# Patient Record
Sex: FEMALE | Race: BLACK OR AFRICAN AMERICAN | URBAN - METROPOLITAN AREA
[De-identification: names, ages, dates, MRNs, and addresses within clinical notes are randomized per-mention and may not be internally consistent; named-entity substitution may affect disease eponyms.]

---

## 2018-07-23 ENCOUNTER — CLINICAL SUPPORT (OUTPATIENT)
Dept: URGENT CARE | Facility: CLINIC | Age: 58
End: 2018-07-23
Payer: COMMERCIAL

## 2018-07-23 DIAGNOSIS — Z23 NEED FOR IMMUNIZATION WITH DIPHTHERIA, TETANUS, AND POLIOVIRUS VACCINE: Primary | ICD-10-CM

## 2018-07-23 PROCEDURE — 90471 IMMUNIZATION ADMIN: CPT | Mod: S$GLB,,, | Performed by: EMERGENCY MEDICINE

## 2018-07-23 PROCEDURE — 90715 TDAP VACCINE 7 YRS/> IM: CPT | Mod: S$GLB,,, | Performed by: EMERGENCY MEDICINE

## 2021-05-24 ENCOUNTER — OFFICE VISIT (OUTPATIENT)
Dept: URGENT CARE | Facility: CLINIC | Age: 61
End: 2021-05-24
Payer: COMMERCIAL

## 2021-05-24 VITALS
RESPIRATION RATE: 18 BRPM | BODY MASS INDEX: 17.77 KG/M2 | HEART RATE: 81 BPM | DIASTOLIC BLOOD PRESSURE: 73 MMHG | HEIGHT: 69 IN | TEMPERATURE: 99 F | SYSTOLIC BLOOD PRESSURE: 127 MMHG | WEIGHT: 120 LBS | OXYGEN SATURATION: 98 %

## 2021-05-24 DIAGNOSIS — K52.9 GASTROENTERITIS: Primary | ICD-10-CM

## 2021-05-24 LAB
CTP QC/QA: YES
SARS-COV-2 RDRP RESP QL NAA+PROBE: NEGATIVE

## 2021-05-24 PROCEDURE — U0002: ICD-10-PCS | Mod: QW,S$GLB,, | Performed by: FAMILY MEDICINE

## 2021-05-24 PROCEDURE — 3008F PR BODY MASS INDEX (BMI) DOCUMENTED: ICD-10-PCS | Mod: CPTII,S$GLB,, | Performed by: FAMILY MEDICINE

## 2021-05-24 PROCEDURE — 99203 OFFICE O/P NEW LOW 30 MIN: CPT | Mod: S$GLB,,, | Performed by: FAMILY MEDICINE

## 2021-05-24 PROCEDURE — U0002 COVID-19 LAB TEST NON-CDC: HCPCS | Mod: QW,S$GLB,, | Performed by: FAMILY MEDICINE

## 2021-05-24 PROCEDURE — 99203 PR OFFICE/OUTPT VISIT, NEW, LEVL III, 30-44 MIN: ICD-10-PCS | Mod: S$GLB,,, | Performed by: FAMILY MEDICINE

## 2021-05-24 PROCEDURE — 3008F BODY MASS INDEX DOCD: CPT | Mod: CPTII,S$GLB,, | Performed by: FAMILY MEDICINE

## 2021-05-24 RX ORDER — LOPERAMIDE HYDROCHLORIDE 2 MG/1
2 CAPSULE ORAL 4 TIMES DAILY PRN
COMMUNITY

## 2021-05-24 RX ORDER — ICOSAPENT ETHYL 500 MG/1
CAPSULE ORAL
COMMUNITY

## 2021-05-24 RX ORDER — FENOFIBRATE 145 MG/1
TABLET, FILM COATED ORAL
COMMUNITY

## 2021-05-24 RX ORDER — ERGOCALCIFEROL 1.25 MG/1
CAPSULE ORAL
COMMUNITY

## 2021-05-24 RX ORDER — DICYCLOMINE HYDROCHLORIDE 20 MG/1
20 TABLET ORAL EVERY 6 HOURS
Qty: 30 TABLET | Refills: 0 | Status: SHIPPED | OUTPATIENT
Start: 2021-05-24

## 2021-05-24 RX ORDER — VITAMIN B COMPLEX
1 CAPSULE ORAL
COMMUNITY

## 2021-05-24 RX ORDER — ASPIRIN 325 MG
325 TABLET ORAL
COMMUNITY

## 2023-12-19 ENCOUNTER — OFFICE VISIT (OUTPATIENT)
Dept: URGENT CARE | Facility: CLINIC | Age: 63
End: 2023-12-19
Payer: COMMERCIAL

## 2023-12-19 VITALS
DIASTOLIC BLOOD PRESSURE: 72 MMHG | SYSTOLIC BLOOD PRESSURE: 125 MMHG | WEIGHT: 120 LBS | OXYGEN SATURATION: 100 % | RESPIRATION RATE: 16 BRPM | HEART RATE: 72 BPM | BODY MASS INDEX: 17.77 KG/M2 | TEMPERATURE: 98 F | HEIGHT: 69 IN

## 2023-12-19 DIAGNOSIS — R11.0 NAUSEA: ICD-10-CM

## 2023-12-19 DIAGNOSIS — R19.7 DIARRHEA, UNSPECIFIED TYPE: Primary | ICD-10-CM

## 2023-12-19 PROCEDURE — 99213 OFFICE O/P EST LOW 20 MIN: CPT | Mod: S$GLB,,, | Performed by: NURSE PRACTITIONER

## 2023-12-19 PROCEDURE — 99213 PR OFFICE/OUTPT VISIT, EST, LEVL III, 20-29 MIN: ICD-10-PCS | Mod: S$GLB,,, | Performed by: NURSE PRACTITIONER

## 2023-12-19 RX ORDER — ONDANSETRON 4 MG/1
4 TABLET, ORALLY DISINTEGRATING ORAL EVERY 6 HOURS PRN
Qty: 20 TABLET | Refills: 1 | Status: SHIPPED | OUTPATIENT
Start: 2023-12-19 | End: 2024-12-18

## 2023-12-19 NOTE — PROGRESS NOTES
"Subjective:      Patient ID: Agatha Ko is a 63 y.o. female.    Vitals:  height is 5' 9" (1.753 m) and weight is 54.4 kg (120 lb). Her temperature is 97.7 °F (36.5 °C). Her blood pressure is 125/72 and her pulse is 72. Her respiration is 16 and oxygen saturation is 100%.     Chief Complaint: Diarrhea    Diarrhea   This is a new problem. The current episode started in the past 7 days (Saturday). The problem occurs 2 to 4 times per day. The problem has been waxing and waning. The stool consistency is described as Mucous (cloudy). The patient states that diarrhea awakens her from sleep. Associated symptoms include abdominal pain, bloating and chills. Pertinent negatives include no coughing, fever, headaches, sweats, URI or vomiting. Nothing aggravates the symptoms. Treatments tried: pepto. The treatment provided mild relief.       Constitution: Positive for chills. Negative for fever.   Respiratory:  Negative for cough.    Gastrointestinal:  Positive for abdominal pain and diarrhea. Negative for vomiting.   Neurological:  Negative for headaches.    Nausea is intermittent   Objective:     Physical Exam   Constitutional: She is oriented to person, place, and time.  Non-toxic appearance. She does not appear ill. No distress.   HENT:   Head: Normocephalic and atraumatic.   Ears:   Right Ear: Tympanic membrane normal.   Left Ear: Tympanic membrane normal.   Nose: No congestion.   Mouth/Throat: Mucous membranes are moist. Oropharynx is clear.   Eyes: Conjunctivae are normal. Pupils are equal, round, and reactive to light. Extraocular movement intact   Cardiovascular: Normal rate, regular rhythm, normal heart sounds and normal pulses.   Pulmonary/Chest: Effort normal and breath sounds normal. No respiratory distress. She has no wheezes.   Abdominal: Normal appearance. Bowel sounds are increased.      Comments: Pt states she is having to wear a depends diaper due to the number of stools    Musculoskeletal:      Right " lower leg: No edema.      Left lower leg: No edema.   Neurological: no focal deficit. She is alert and oriented to person, place, and time.   Skin: Skin is not diaphoretic.   Psychiatric: Her behavior is normal. Mood normal.   Nursing note and vitals reviewed.  VSS no need for IV fluids at this time.  Pt is drinking well (Powerade and Pedilyte)  Assessment:     1. Diarrhea, unspecified type    2. Nausea  - ondansetron (ZOFRAN-ODT) 4 MG TbDL; Take 1 tablet (4 mg total) by mouth every 6 (six) hours as needed (for nausea and vomiting).  Dispense: 20 tablet; Refill: 1          Plan:     Patient Instructions   Diarrhea, Adult ED   General Information   You came to the Emergency Department (ED) for diarrhea. Most doctors say you have diarrhea if you have 3 or more runny or watery stools or bowel movements in a day. The doctors feel that the risk of a serious cause for your diarrhea is low.  Diarrhea that starts all of a sudden is most often caused by a virus or bacteria. This will likely get better on its own after a few days. Other things can cause you to have loose stools like:  Side effects from the medicines you take.  Problems digesting your food.  Problems with your digestive system.  You may be waiting on some test results. The staff will contact you if there are concerning results.  What care is needed at home?   Call your regular doctor to let them know you were in the ED. Make a follow-up appointment if you were told to.  Drink small amounts of fluid every 15 to 30 minutes. Good fluids to drink are water, broth, and oral electrolyte solutions. Sugar-free or very low sugar sports drinks are also OK.  Try to eat a small amount of food. Good foods to eat are potatoes, noodles, rice, oatmeal, crackers, soup, soft vegetables, and bananas. Avoid fatty foods and dairy products.  Wash your hands often. This will help keep others healthy. It is easy to spread diarrhea from one person to the next.  Stay home from school  or work until you have stopped having diarrhea. Do not cook food for others while you have diarrhea.  If you were given any medicines, make sure to take them as you were told.  When do I need to get emergency help?   Return to the ED if:   You have signs of severe fluid loss, such as:  No urine for more than 8 hours.  Feel very light-headed or like you are going to pass out.  Feel weak like you are going to fall.  Your stools have a large amount (more than 1 teaspoon or 5 mL) of blood in them.  You have very bad belly pain.  When do I need to call the doctor?   You have more than 6 runny, watery stools in 24 hours.  You have a fever of 101.3°F (38.5°C) or higher or chills that do not go away after a day.  You have very bad belly pain.  Your stools have a small amount (less than 1 teaspoon or 5 mL) of blood in them.  You develop early signs of fluid loss, such as:  Your urine is very dark colored.  Your mouth is dry.  You have muscle cramps.  You have a lack of energy.  You feel light-headed when you get up.  You have new or worsening symptoms.  Last Reviewed Date   2021-05-21  Consumer Information Use and Disclaimer   This information is not specific medical advice and does not replace information you receive from your health care provider. This is only a brief summary of general information. It does NOT include all information about conditions, illnesses, injuries, tests, procedures, treatments, therapies, discharge instructions or life-style choices that may apply to you. You must talk with your health care provider for complete information about your health and treatment options. This information should not be used to decide whether or not to accept your health care providers advice, instructions or recommendations. Only your health care provider has the knowledge and training to provide advice that is right for you.  Copyright   Copyright © 2021 [employer] and its affiliates and/or licensors. All rights  reserved.

## 2023-12-19 NOTE — PATIENT INSTRUCTIONS
Diarrhea, Adult ED   General Information   You came to the Emergency Department (ED) for diarrhea. Most doctors say you have diarrhea if you have 3 or more runny or watery stools or bowel movements in a day. The doctors feel that the risk of a serious cause for your diarrhea is low.  Diarrhea that starts all of a sudden is most often caused by a virus or bacteria. This will likely get better on its own after a few days. Other things can cause you to have loose stools like:  Side effects from the medicines you take.  Problems digesting your food.  Problems with your digestive system.  You may be waiting on some test results. The staff will contact you if there are concerning results.  What care is needed at home?   Call your regular doctor to let them know you were in the ED. Make a follow-up appointment if you were told to.  Drink small amounts of fluid every 15 to 30 minutes. Good fluids to drink are water, broth, and oral electrolyte solutions. Sugar-free or very low sugar sports drinks are also OK.  Try to eat a small amount of food. Good foods to eat are potatoes, noodles, rice, oatmeal, crackers, soup, soft vegetables, and bananas. Avoid fatty foods and dairy products.  Wash your hands often. This will help keep others healthy. It is easy to spread diarrhea from one person to the next.  Stay home from school or work until you have stopped having diarrhea. Do not cook food for others while you have diarrhea.  If you were given any medicines, make sure to take them as you were told.  When do I need to get emergency help?   Return to the ED if:   You have signs of severe fluid loss, such as:  No urine for more than 8 hours.  Feel very light-headed or like you are going to pass out.  Feel weak like you are going to fall.  Your stools have a large amount (more than 1 teaspoon or 5 mL) of blood in them.  You have very bad belly pain.  When do I need to call the doctor?   You have more than 6 runny, watery stools in  24 hours.  You have a fever of 101.3°F (38.5°C) or higher or chills that do not go away after a day.  You have very bad belly pain.  Your stools have a small amount (less than 1 teaspoon or 5 mL) of blood in them.  You develop early signs of fluid loss, such as:  Your urine is very dark colored.  Your mouth is dry.  You have muscle cramps.  You have a lack of energy.  You feel light-headed when you get up.  You have new or worsening symptoms.  Last Reviewed Date   2021-05-21  Consumer Information Use and Disclaimer   This information is not specific medical advice and does not replace information you receive from your health care provider. This is only a brief summary of general information. It does NOT include all information about conditions, illnesses, injuries, tests, procedures, treatments, therapies, discharge instructions or life-style choices that may apply to you. You must talk with your health care provider for complete information about your health and treatment options. This information should not be used to decide whether or not to accept your health care providers advice, instructions or recommendations. Only your health care provider has the knowledge and training to provide advice that is right for you.  Copyright   Copyright © 2021 [employer] and its affiliates and/or licensors. All rights reserved.

## 2024-03-04 ENCOUNTER — OFFICE VISIT (OUTPATIENT)
Dept: URGENT CARE | Facility: CLINIC | Age: 64
End: 2024-03-04
Payer: COMMERCIAL

## 2024-03-04 VITALS
RESPIRATION RATE: 16 BRPM | OXYGEN SATURATION: 99 % | WEIGHT: 122.69 LBS | BODY MASS INDEX: 18.17 KG/M2 | DIASTOLIC BLOOD PRESSURE: 67 MMHG | TEMPERATURE: 98 F | SYSTOLIC BLOOD PRESSURE: 119 MMHG | HEART RATE: 73 BPM | HEIGHT: 69 IN

## 2024-03-04 DIAGNOSIS — R19.7 DIARRHEA, UNSPECIFIED TYPE: ICD-10-CM

## 2024-03-04 DIAGNOSIS — R10.84 GENERALIZED ABDOMINAL PAIN: Primary | ICD-10-CM

## 2024-03-04 LAB
BILIRUB UR QL STRIP: POSITIVE
GLUCOSE UR QL STRIP: NEGATIVE
KETONES UR QL STRIP: NEGATIVE
LEUKOCYTE ESTERASE UR QL STRIP: NEGATIVE
PH, POC UA: 5.5
POC BLOOD, URINE: POSITIVE
POC NITRATES, URINE: NEGATIVE
PROT UR QL STRIP: NEGATIVE
SP GR UR STRIP: 1.02 (ref 1–1.03)
UROBILINOGEN UR STRIP-ACNC: NORMAL (ref 0.1–1.1)

## 2024-03-04 PROCEDURE — 81003 URINALYSIS AUTO W/O SCOPE: CPT | Mod: QW,S$GLB,, | Performed by: NURSE PRACTITIONER

## 2024-03-04 PROCEDURE — 99214 OFFICE O/P EST MOD 30 MIN: CPT | Mod: S$GLB,,, | Performed by: NURSE PRACTITIONER

## 2024-03-04 RX ORDER — DICYCLOMINE HYDROCHLORIDE 20 MG/1
20 TABLET ORAL EVERY 6 HOURS PRN
Qty: 20 TABLET | Refills: 0 | Status: SHIPPED | OUTPATIENT
Start: 2024-03-04 | End: 2024-03-09

## 2024-03-04 RX ORDER — LIDOCAINE HYDROCHLORIDE 20 MG/ML
10 SOLUTION OROPHARYNGEAL
Status: COMPLETED | OUTPATIENT
Start: 2024-03-04 | End: 2024-03-04

## 2024-03-04 RX ORDER — ALUMINUM HYDROXIDE, MAGNESIUM HYDROXIDE, AND SIMETHICONE 1200; 120; 1200 MG/30ML; MG/30ML; MG/30ML
30 SUSPENSION ORAL
Status: COMPLETED | OUTPATIENT
Start: 2024-03-04 | End: 2024-03-04

## 2024-03-04 RX ORDER — DICYCLOMINE HYDROCHLORIDE 10 MG/5ML
20 SOLUTION ORAL
Status: COMPLETED | OUTPATIENT
Start: 2024-03-04 | End: 2024-03-04

## 2024-03-04 RX ADMIN — ALUMINUM HYDROXIDE, MAGNESIUM HYDROXIDE, AND SIMETHICONE 30 ML: 1200; 120; 1200 SUSPENSION ORAL at 11:03

## 2024-03-04 RX ADMIN — DICYCLOMINE HYDROCHLORIDE 20 MG: 10 SOLUTION ORAL at 11:03

## 2024-03-04 RX ADMIN — LIDOCAINE HYDROCHLORIDE 10 ML: 20 SOLUTION OROPHARYNGEAL at 11:03

## 2024-03-04 NOTE — PATIENT INSTRUCTIONS
- You must understand that you have received an Urgent Care treatment only and that you may be released before all of your medical problems are known or treated.   - You, the patient, will arrange for follow up care as instructed.   - If your condition worsens or fails to improve we recommend that you receive another evaluation at the ER immediately or contact your PCP to discuss your concerns.   - You can call [phone] or [phone] to help schedule an appointment with the appropriate provider.    ABDOMINAL PAIN          Based on your visit today, the exact cause of your abdominal (stomach) pain is not certain. Your condition does not seem serious now; however, the signs of a serious problem may take more time to appear. Therefore, it is important for you to watch for any new symptoms or worsening of your condition as we discussed in the clinic, including appendicitis, kidney stones, diverticulitis      HOME CARE:  1. Rest until your next exam. No strenuous activities.  2. Eat a diet low in fiber (called a low-residue diet). Foods allowed include refined breads, white rice, fruit and vegetable juices without pulp, tender meats. These foods will pass more easily through the intestine.  3. Avoid whole-grain foods, whole fruits and vegetables, meats, seeds and nuts, fried or fatty foods, dairy, alcohol and spicy foods until your symptoms go away.     FOLLOW UP with your doctor or this facility as instructed, or if your pain does not begin to improve in the next 24 hours.     GET PROMPT MEDICAL ATTENTION if any of the following occur:  Pain gets worse or moves to the right lower abdomen  New or worsening vomiting or diarrhea  Swelling of the abdomen  Unable to pass stool for more than three days  New fever over 100.0º F (37.8ºC), or rising fever  Blood in vomit or bowel movements (dark red or black color)  Jaundice (yellow color of eyes and skin)  Weakness, dizziness or fainting  Chest, arm, back, neck or jaw  pain  Unexpected vaginal bleeding or missed period

## 2024-03-04 NOTE — PROGRESS NOTES
"Subjective:      Patient ID: Agatha Ko is a 64 y.o. female.    Vitals:  height is 5' 9" (1.753 m) and weight is 55.7 kg (122 lb 11 oz). Her oral temperature is 98 °F (36.7 °C). Her blood pressure is 119/67 and her pulse is 73. Her respiration is 16 and oxygen saturation is 99%.     Chief Complaint: Abdominal Pain    Pt is a 65 yo female w/ c/o abdominal pain for two days, pt states she had vomiting for one day, approximately 7 episodes, but that has resolved. She also reports diarrhea which began today. Pt states she has bloating and belching. Abdominal pain is worse when sitting and bending and better w/ laying down. She took Pepto bismol yesterday with possible mild relief.     Abdominal Pain  This is a new problem. Episode onset: 2 days ago. The problem occurs intermittently. The problem has been unchanged. The pain is located in the epigastric region and suprapubic region. The pain is at a severity of 7/10. The pain is severe. The quality of the pain is sharp and cramping. The abdominal pain radiates to the epigastric region and suprapubic region. Associated symptoms include belching, diarrhea, nausea and vomiting. Pertinent negatives include no anorexia, arthralgias, constipation, dysuria, fever, flatus, frequency, headaches, hematochezia, hematuria, melena, myalgias or weight loss. Exacerbated by: bending. The pain is relieved by Sitting up and recumbency. Treatments tried: pepto bismol. The treatment provided mild relief. There is no history of abdominal surgery, colon cancer, Crohn's disease, gallstones, GERD, irritable bowel syndrome, pancreatitis, PUD or ulcerative colitis. Patient's medical history does not include kidney stones and UTI.       Constitution: Negative for fever.   Gastrointestinal:  Positive for abdominal pain, nausea, vomiting and diarrhea. Negative for history of abdominal surgery, constipation and bright red blood in stool.   Genitourinary:  Negative for dysuria, frequency and " hematuria.   Musculoskeletal:  Negative for joint pain and muscle ache.   Neurological:  Negative for headaches.      Objective:     Physical Exam   Constitutional: She is oriented to person, place, and time.   HENT:   Head: Normocephalic.   Ears:   Right Ear: External ear normal.   Left Ear: External ear normal.   Nose: Nose normal.   Mouth/Throat: Mucous membranes are moist.   Eyes: Conjunctivae are normal.   Cardiovascular: Normal rate.   Pulmonary/Chest: Effort normal.   Abdominal: Soft. Bowel sounds are increased. There is generalized abdominal tenderness. There is no rebound, no guarding, no tenderness at McBurney's point, negative Branham's sign, no left CVA tenderness, negative Rovsing's sign, negative psoas sign, no right CVA tenderness and negative obturator sign.   Musculoskeletal: Normal range of motion.         General: Normal range of motion.   Neurological: She is alert and oriented to person, place, and time.   Skin: Skin is dry.   Psychiatric: Her behavior is normal.   Nursing note and vitals reviewed.    Results for orders placed or performed in visit on 03/04/24   POCT Urinalysis, Dipstick, Automated, W/O Scope   Result Value Ref Range    POC Blood, Urine Positive (A) Negative    POC Bilirubin, Urine Positive (A) Negative    POC Urobilinogen, Urine normal 0.1 - 1.1    POC Ketones, Urine Negative Negative    POC Protein, Urine Negative Negative    POC Nitrates, Urine Negative Negative    POC Glucose, Urine Negative Negative    pH, UA 5.5     POC Specific Gravity, Urine 1.025 1.003 - 1.029    POC Leukocytes, Urine Negative Negative     Assessment:     1. Generalized abdominal pain    2. Diarrhea, unspecified type        Plan:       Generalized abdominal pain  -     POCT Urinalysis, Dipstick, Automated, W/O Scope  -     LIDOcaine viscous HCl 2% oral solution 10 mL  -     aluminum-magnesium hydroxide-simethicone 200-200-20 mg/5 mL suspension 30 mL  -     dicyclomine 10 mg/5 mL syrup 20 mg  -      dicyclomine (BENTYL) 20 mg tablet; Take 1 tablet (20 mg total) by mouth every 6 (six) hours as needed (abdominal cramping).  Dispense: 20 tablet; Refill: 0    Diarrhea, unspecified type      Patient Instructions   - You must understand that you have received an Urgent Care treatment only and that you may be released before all of your medical problems are known or treated.   - You, the patient, will arrange for follow up care as instructed.   - If your condition worsens or fails to improve we recommend that you receive another evaluation at the ER immediately or contact your PCP to discuss your concerns.   - You can call [phone] or [phone] to help schedule an appointment with the appropriate provider.    ABDOMINAL PAIN          Based on your visit today, the exact cause of your abdominal (stomach) pain is not certain. Your condition does not seem serious now; however, the signs of a serious problem may take more time to appear. Therefore, it is important for you to watch for any new symptoms or worsening of your condition as we discussed in the clinic, including appendicitis, kidney stones, diverticulitis      HOME CARE:  1. Rest until your next exam. No strenuous activities.  2. Eat a diet low in fiber (called a low-residue diet). Foods allowed include refined breads, white rice, fruit and vegetable juices without pulp, tender meats. These foods will pass more easily through the intestine.  3. Avoid whole-grain foods, whole fruits and vegetables, meats, seeds and nuts, fried or fatty foods, dairy, alcohol and spicy foods until your symptoms go away.     FOLLOW UP with your doctor or this facility as instructed, or if your pain does not begin to improve in the next 24 hours.     GET PROMPT MEDICAL ATTENTION if any of the following occur:  Pain gets worse or moves to the right lower abdomen  New or worsening vomiting or diarrhea  Swelling of the abdomen  Unable to pass stool for more than three  days  New fever over 100.0º F (37.8ºC), or rising fever  Blood in vomit or bowel movements (dark red or black color)  Jaundice (yellow color of eyes and skin)  Weakness, dizziness or fainting  Chest, arm, back, neck or jaw pain  Unexpected vaginal bleeding or missed period

## 2025-05-19 ENCOUNTER — OFFICE VISIT (OUTPATIENT)
Dept: URGENT CARE | Facility: CLINIC | Age: 65
End: 2025-05-19
Payer: MEDICARE

## 2025-05-19 VITALS
OXYGEN SATURATION: 99 % | RESPIRATION RATE: 20 BRPM | HEIGHT: 69 IN | WEIGHT: 126.63 LBS | BODY MASS INDEX: 18.76 KG/M2 | DIASTOLIC BLOOD PRESSURE: 60 MMHG | HEART RATE: 66 BPM | SYSTOLIC BLOOD PRESSURE: 126 MMHG | TEMPERATURE: 98 F

## 2025-05-19 DIAGNOSIS — M10.9 ACUTE GOUT OF LEFT HAND, UNSPECIFIED CAUSE: Primary | ICD-10-CM

## 2025-05-19 DIAGNOSIS — M79.642 LEFT HAND PAIN: ICD-10-CM

## 2025-05-19 PROCEDURE — 99213 OFFICE O/P EST LOW 20 MIN: CPT | Mod: S$GLB,,,

## 2025-05-19 PROCEDURE — 73110 X-RAY EXAM OF WRIST: CPT | Mod: LT,S$GLB,, | Performed by: RADIOLOGY

## 2025-05-19 RX ORDER — COLCHICINE 0.6 MG/1
TABLET ORAL
Qty: 10 TABLET | Refills: 1 | Status: SHIPPED | OUTPATIENT
Start: 2025-05-19

## 2025-05-19 NOTE — PROGRESS NOTES
"Subjective:      Patient ID: Agatha Ko is a 65 y.o. female.    Vitals:  height is 5' 9" (1.753 m) and weight is 57.5 kg (126 lb 10.5 oz). Her oral temperature is 97.6 °F (36.4 °C). Her blood pressure is 126/60 and her pulse is 66. Her respiration is 20 and oxygen saturation is 99%.     Chief Complaint: Hand Pain    66 yo female c/o lt hand px. Pt states she was woken out of her sleep from the px last night. Pt states she put some Biofreeze on it. No hx of gout.     Hand Pain   Her dominant hand is their left hand. There was no injury mechanism. The pain is present in the left hand. The quality of the pain is described as aching and stabbing. The pain does not radiate. The pain is at a severity of 6/10. The pain is moderate. The pain has been Constant since the incident. The symptoms are aggravated by movement. She has tried acetaminophen for the symptoms. The treatment provided mild relief.       Constitution: Negative for fever.   Musculoskeletal:  Positive for pain, joint pain, joint swelling and abnormal ROM of joint. Negative for trauma.   Skin:  Negative for erythema.      Objective:     Physical Exam   Constitutional: She is oriented to person, place, and time. She appears well-developed.   HENT:   Head: Normocephalic and atraumatic. Head is without abrasion, without contusion and without laceration.   Ears:   Right Ear: External ear normal.   Left Ear: External ear normal.   Nose: Nose normal.   Mouth/Throat: Oropharynx is clear and moist and mucous membranes are normal.   Eyes: Conjunctivae, EOM and lids are normal. Pupils are equal, round, and reactive to light.   Neck: Trachea normal and phonation normal. Neck supple.   Cardiovascular: Normal rate, regular rhythm and normal heart sounds.   Pulmonary/Chest: Effort normal and breath sounds normal. No stridor. No respiratory distress.   Musculoskeletal:        Hands:    Neurological: She is alert and oriented to person, place, and time.   Skin: Skin is " warm, dry, intact and no rash. Capillary refill takes less than 2 seconds. No abrasion, No burn, No bruising, No erythema and No ecchymosis   Psychiatric: Her speech is normal and behavior is normal. Judgment and thought content normal.   Nursing note and vitals reviewed.      Assessment:     1. Acute gout of left hand, unspecified cause    2. Left hand pain        Plan:     EXAMINATION:  XR WRIST COMPLETE 3 VIEWS LEFT     CLINICAL HISTORY:  Pain in left hand     TECHNIQUE:  PA, lateral, and oblique views of the left wrist were performed.     COMPARISON:  None.     FINDINGS:  Skeletal structures are intact with no fracture or dislocation identified.  The scapholunate interval is mildly widened suggesting ligamentous pathology.  The area of the triangular fibrocartilage has  soft tissue swelling and several amorphous calcifications.  This is consistent with some form of crystal deposition disease such as gout or CPPD.  Correlate clinically.     Impression:     No acute osseous abnormality.     Ulnar side of the wrist shows some soft tissue swelling and calcifications consistent with crystal deposition disease as above.        Electronically signed by:Elio Canela MD  Date:                                            05/19/2025  Time:                                           11:23      Acute gout of left hand, unspecified cause  -     colchicine (COLCRYS) 0.6 mg tablet; Take 2 tablets (1.2 mg) at the first sign of flare, then 1 tablet (0.6 mg) by mouth one hour later  Dispense: 10 tablet; Refill: 1    Left hand pain  -     X-Ray Wrist Complete 3 views Left; Future; Expected date: 05/19/2025  -     Ambulatory referral/consult to Orthopedics  -     WRIST BRACE FOR HOME USE            Addendum:  5/21/2025 11:24  Pt left the clinic before x-ray was read by radiologist. X-ray shows possible gout. I prescribed colchicine and sent it to patient's pharmacy. I called patient afterwards with no answer. I called again on  5/22 and today at this time with no answer; multiple voicemails sent without reaching patient to notify of x-ray result and treatment management. I advised NSAIDs/tylenol for pain within clinic which patient had relief from at home PTA. Wrist brace given. Ortho referral made for ongoing pain.

## 2025-05-19 NOTE — PATIENT INSTRUCTIONS
Colchicine  Day 1: Take 2 tablets at the first sign of flare, followed by 1 tablet after one hour   Day 2 and thereafter: 1 tablet once or twice daily until flare resolves. Some experts continue for 48 hours after flare resolves  ---- Take medications with food and water to avoid GI upset.   ---- may cause cause diarrhea; bland diet if it occurs    Alternate tylenol and ibuprofen every 4 hours as needed for pain. Always take ibuprofen with food and water to avoid GI upset. Stop taking if you develop abdominal pain or blood in stool.      Make sure you are drinking plenty of water per day with a nutritious diet. A Mediterranean diet helps decrease inflammation. Elevate your foot today and intermittently apply an ice pack. Avoid alcohol, red meat, shellfish and sweetened drinks.     Wear the brace for immobilization and compression.   Rest and elevate the affected painful area.    Apply cold compresses intermittently as needed.    Avoid activities that cause pain.   As pain recedes, begin normal activities slowly as tolerated.      Follow up with ortho in 1 week for continued pain. A referral was placed for you, call [phone] to schedule appointment.         ([facility] recommendation)  The Mediterranean diet is based on plant-based foods and healthy fats.     You eat LOTS of vegetables, fruits, beans, lentils, nuts, whole grains (wheat bread, brown rice) & extra virgin olive oil.   ---- These foods are high in fiber and antioxidants.   ---- These nutrients help reduce inflammation.   ---- Fiber helps regulate bowel movements.   ---- Antioxidants protect you against cancer.     Eat a moderate amount of fish (salmon, herring, mackerel, sardines, anchovies, halibut, rainbow trout, tuna)   ---- (fish are high in omega-3 fatty acids)    Eat a moderate amount of cheese and yogurt.    Eat little or no meat-- eat more poultry (baked chicken, grilled chicken, ground turkey)  ---- avoid red meat and pork (causes  inflammation and linked to colon cancer)    Eat little or no sweats, sugary drinks or butter.     Limit your sodium intake. A diet high in salt can increase your blood pressure and predisposes you to a heart attack or stroke.     BENEFITS OF DIET  --- Lowers your risk of cardiovascular disease (heart attack, stroke) and many other diseases.   --- Supports a healthy body weight.   --- Supports a healthy blood sugar, blood pressure and cholesterol.   --- Lowers your risk of metabolic syndrome   --- Supports a heathy balance of good gut bacteria in your digestive system.   --- Lowers your risk for cancer.   --- Helps with brain function and slows decline as we age.   --- Helps you live longer.     Talk to your primary care doctor about a dietician referral for further guidance.